# Patient Record
(demographics unavailable — no encounter records)

---

## 2022-07-16 LAB
INFLUENZA A: NOT DETECTED
INFLUENZA B: NOT DETECTED
SARS-COV-2: NOT DETECTED

## 2022-07-27 PROBLEM — Z17.0 ESTROGEN RECEPTOR POSITIVE: Status: ACTIVE | Noted: 2021-12-08

## 2022-07-27 PROBLEM — C50.811 MALIGNANT NEOPLASM OF OVERLAPPING SITES OF RIGHT FEMALE BREAST (HCC): Status: ACTIVE | Noted: 2022-07-27

## 2022-07-27 PROBLEM — Z85.3 HISTORY OF RIGHT BREAST CANCER: Status: ACTIVE | Noted: 2022-07-18

## 2022-07-27 PROBLEM — M25.561 RIGHT KNEE PAIN: Status: ACTIVE | Noted: 2022-06-24

## 2022-07-27 PROBLEM — Z79.810 CARE RELATED TO CURRENT TAMOXIFEN USE: Status: ACTIVE | Noted: 2021-12-08

## 2022-07-27 PROBLEM — M25.562 LEFT KNEE PAIN: Status: ACTIVE | Noted: 2022-06-24

## 2022-07-27 PROBLEM — M17.0 PRIMARY OSTEOARTHRITIS OF BOTH KNEES: Status: ACTIVE | Noted: 2022-06-24

## 2022-07-27 PROBLEM — E03.9 HYPOTHYROIDISM: Status: ACTIVE | Noted: 2022-07-27

## 2022-08-04 PROBLEM — M25.562 LEFT KNEE PAIN: Status: RESOLVED | Noted: 2022-06-24 | Resolved: 2022-08-04

## 2022-08-04 PROBLEM — N64.89 BREASTS ASYMMETRICAL: Status: ACTIVE | Noted: 2021-10-07

## 2022-08-04 PROBLEM — Z90.13 HISTORY OF BILATERAL MASTECTOMY: Status: ACTIVE | Noted: 2022-04-19

## 2022-08-04 PROBLEM — J45.909 ASTHMA: Status: ACTIVE | Noted: 2021-10-07

## 2022-08-04 PROBLEM — M25.561 RIGHT KNEE PAIN: Status: RESOLVED | Noted: 2022-06-24 | Resolved: 2022-08-04

## 2022-08-04 PROBLEM — C50.919 INFILTRATING DUCTAL CARCINOMA OF BREAST (HCC): Status: ACTIVE | Noted: 2021-10-07

## 2022-08-04 PROBLEM — C50.919 INFILTRATING DUCTAL CARCINOMA OF BREAST (HCC): Status: RESOLVED | Noted: 2021-10-07 | Resolved: 2022-08-04

## 2022-08-25 PROBLEM — M17.9 OSTEOARTHRITIS OF KNEE: Status: ACTIVE | Noted: 2022-08-25

## 2022-08-25 PROBLEM — E78.5 HYPERLIPIDEMIA: Status: ACTIVE | Noted: 2022-08-25

## 2022-08-25 PROBLEM — M25.569 PAIN IN JOINT, LOWER LEG: Status: ACTIVE | Noted: 2022-08-25

## 2022-08-25 PROBLEM — J32.9 SINUSITIS: Status: ACTIVE | Noted: 2022-08-25

## 2022-08-25 PROBLEM — E03.9 HYPOTHYROIDISM, UNSPECIFIED: Status: ACTIVE | Noted: 2022-08-25

## 2022-08-25 PROBLEM — H52.209 ASTIGMATISM: Status: ACTIVE | Noted: 2022-08-25

## 2022-08-25 PROBLEM — N63.0 BREAST LUMP OR MASS: Status: ACTIVE | Noted: 2022-08-25

## 2022-08-25 PROBLEM — N60.19 FIBROCYSTIC DISEASE OF BREAST: Status: ACTIVE | Noted: 2022-08-25

## 2022-08-25 PROBLEM — D69.6 THROMBOCYTOPENIA, UNSPECIFIED (HCC): Status: ACTIVE | Noted: 2022-08-25

## 2022-08-25 PROBLEM — D72.819 DECREASED WHITE BLOOD CELL COUNT, UNSPECIFIED: Status: ACTIVE | Noted: 2022-08-25

## 2022-08-25 PROBLEM — M62.81 MUSCLE WEAKNESS (GENERALIZED): Status: ACTIVE | Noted: 2022-08-25

## 2022-08-25 PROBLEM — N97.9 FEMALE INFERTILITY: Status: ACTIVE | Noted: 2022-08-25

## 2022-08-25 PROBLEM — R63.5 ABNORMAL WEIGHT GAIN: Status: ACTIVE | Noted: 2022-08-25

## 2022-08-25 PROBLEM — R10.2 FEMALE PELVIC PAIN: Status: ACTIVE | Noted: 2022-08-25

## 2022-08-25 PROBLEM — C50.911 MALIGNANT NEOPLASM OF UNSPECIFIED SITE OF RIGHT FEMALE BREAST (HCC): Status: ACTIVE | Noted: 2022-08-25

## 2022-08-25 PROBLEM — M25.562 PAIN IN LEFT KNEE: Status: ACTIVE | Noted: 2022-08-25

## 2022-08-25 PROBLEM — D64.9 ANEMIA, UNSPECIFIED: Status: ACTIVE | Noted: 2022-08-25

## 2022-08-25 PROBLEM — R10.9 ABDOMINAL PAIN: Status: ACTIVE | Noted: 2022-08-25

## 2022-09-24 PROBLEM — J32.9 SINUSITIS: Status: RESOLVED | Noted: 2022-08-25 | Resolved: 2022-09-24

## 2022-10-19 NOTE — DISCHARGE SUMMARY
ED Clinical Summary                         [facility]  [address]  [place], [place], [address] [phone]           PERSON INFORMATION  Name: Dana Al Age:  39 Years : 1977   Sex: Female Language: English PCP: PCP,  UNKNOWN   Marital Status:   Phone: [phone] Med Service: MED-Medicine   MRN:  [id number] Acct# [de-identified] Arrival: 2022 13:05:00   Visit Reason: Headache; Body aches; Breast pain right; RIGHT BREAST SWELLING Acuity: 3 LOS: 000 02:15   Address:      [address] [place] [address]  Diagnosis:      Breast swelling; S/P bilateral mastectomy  Printed Prescriptions: Allergies      No Known Medication Allergies      Medications Administered During Visit:                  Medication Dose Route   cephalexin 500 mg Oral   sulfamethoxazole-trimethoprim 1 tabs Oral       Patient Medication List:              levothyroxine (Synthroid 125 mcg (0.125 mg) oral tablet) 1 Tabs Oral (given by mouth) every day. sulfamethoxazole-trimethoprim (Bactrim  mg-160 mg oral tablet) 1 Tabs Oral (given by mouth) 2 times a day for 7 Days. Refills: 0.  tamoxifen (tamoxifen 10 mg oral tablet) 1 Tabs Oral (given by mouth) 2 times a day. Major Tests and Procedures: The following procedures and tests were performed during your ED visit.   COMMONPROCEDURES%>  COMMON PROCEDURESCOMMENTS%>          Laboratory Orders  Name Status Details   [facility] Completed Nasopharyngeal Swab, Stat, ST - Stat, 22 13:12:00 EDT, 22 13:12:00 EDT, Nurse Zabrina aggarwal E-PAC, Print label Y/N               Radiology Orders  Name Status Details    General Breast Limited Right Completed 22 13:38:00 EDT, STAT 1 hour or less, Reason: Disorder of breast, unspecified, Transport Mode: STRETCHER, pp_set_radiology_subspecialty               Patient Care Orders  Name Status Details   COVID-19 Status Ordered 22 13:12:42 EDT, NOT [address]arnie Hong, laboratory, radiology. , 07/16/22 13:12:42 EDT, COVID-19 Not Detected   DC ISO Order/Icons Ordered 07/16/22 13:40:54 EDT, 07/16/22 13:40:54 EDT   Discharge Patient Ordered 07/16/22 15:07:00 EDT   ED Assessment Adult Completed 07/16/22 13:10:01 EDT, 07/16/22 13:10:01 EDT   ED Secondary Triage Completed 07/16/22 13:10:01 EDT, 07/16/22 13:10:01 EDT   ED Triage Adult Completed 07/16/22 13:06:00 EDT, 07/16/22 13:06:00 EDT   Notify Provider Completed 07/16/22 13:12:42 EDT, This message can only be seen by Nursing, it is not visible to Pharmacy, Laboratory, or Radiology. , 07/16/22 13:12:42 EDT   Patient Isolation Ordered 07/16/22 13:12:00 EDT, Contact and Airborne, Constant Indicator             PROVIDER INFORMATION               Provider Role Assigned [facility] ED MidLevel 7/16/2022 13:11:54    Mendoza MARTINS-JEOVANNY ED Nurse 7/16/2022 13:24:56        Attending Physician:  Emeka Hobbs E-PAC     Admit Doc  Emeka Hobbs E-PAC     Consulting Doc       VITALS INFORMATION  Vital Sign Triage Latest   Temp Oral ORAL_1%>36.8 degC ORAL%>36.8 degC   Temp Temporal TEMPORAL_1%> TEMPORAL%>   Temp Intravascular INTRAVASCULAR_1%> INTRAVASCULAR%>   Temp Axillary AXILLARY_1%> AXILLARY%>   Temp Rectal RECTAL_1%> RECTAL%>   02 Sat 96 % 96 %   Respiratory Rate RATE_1%>16 br/min RATE%>18 br/min   Peripheral Pulse Rate PULSE RATE_1%> PULSE RATE%>   Apical Heart Rate HEART RATE_1%> HEART RATE%>   Blood Pressure BLOOD PRESSURE_1%>/ BLOOD PRESSURE_1%>86 mmHg BLOOD PRESSURE%>137 mmHg / BLOOD PRESSURE%>86 mmHg                 Immunizations      No Immunizations Documented This Visit          DISCHARGE INFORMATION   Discharge Disposition: H Outpt-Sent Home   Discharge Location:    Home   Discharge Date and Time:    7/16/2022 15:20:24   ED Checkout Date and Time:    7/16/2022 15:20:24     DEPART REASON INCOMPLETE INFORMATION               Depart Action Incomplete Reason   Interactive View/I&O Recently assessed               Problems      No Problems Documented              Smoking Status      No Smoking Status Documented         PATIENT EDUCATION INFORMATION  Instructions:       Breast Tenderness     Follow up:                    With: Address: When:   Primary care/ clinic  Within 3 to 5 days   Comments: Thank you so much for visiting with us today. You have had a screening emergency medical exam and to this point, no emergent condition has been identified. It is   important to realize that of course your visit today is simply a moment in time and that your medical condition can certainly change and bcome worse, necessitating re-   evaluation in an urgent or emergent manner. It is your responsibility to seek care in such instances. It is also your responsibility to follow up with your primary care   provider to discuss your Emergency Department visit and to go over any and all testing that was incurred during your Emergency Department visit. We have made you   aware of any pertinent results at this visit, however this may not have been comprehensive. Lack of an acute emergency condition does not mean that there is not a   problem, nor does it replace a thorough exam performed by a primary care physician. It is your responsibility to follow your discharge instructions as given, to follow up   with any other physicians as indicated, and to return to the Emergency Department as instructed. Thank you again for visiting with us today, please let us know if you   have any further questions. Please follow-up with your primary care provider over the next 3-5 days. Please return to the ER if symptoms change, worsen, or for any new concern. With: Address: When:   Wayne Jose  Within 2 to 3 days   Comments: Follow-up with breast surgery on Monday based on your ultrasound today. Also reach out to your oncologist at your earliest convenience.      [facility]   [address] [facility]. [address], [address] [facility]   [phone]           ED PROVIDER DOCUMENTATION

## 2022-10-19 NOTE — ED NOTES
ED Triage Note       ED Triage Adult Entered On:  7/16/2022 13:10 EDT    Performed On:  7/16/2022 13:06 EDT by Domenica Grimes               Triage   Numeric Rating Pain Scale :   7   Chief Complaint :   pt ambulates to triage reports swelling in the right breast with pain. having headache, body aches and nausea that started yesterday evening. [place] [facility] Mode of Arrival :   Private vehicle   Infectious Disease Documentation :   Document assessment   Temperature Oral :   36.8 degC(Converted to: 98.2 degF)    Heart Rate Monitored :   95 bpm   Respiratory Rate :   16 br/min   Systolic Blood Pressure :   952 mmHg   Diastolic Blood Pressure :   86 mmHg   SpO2 :   96 %   Oxygen Therapy :   Room air   Patient presentation :   None of the above   Chief Complaint or Presentation suggest infection :   Yes   Dosing Weight Obtained By :   Measured   Weight Dosing :   85.1 kg(Converted to: 187 lb 10 oz)    Height :   178 cm(Converted to: 5 ft 10 in)    Body Mass Index Dosing :   27 kg/m2   Robin Garcia - 7/16/2022 13:06 EDT   DCP GENERIC CODE   Tracking Acuity :   3   Tracking Group :   ED [facility]   Domenica Grimes - 7/16/2022 13:06 EDT   ED General Section :   Document assessment   Pregnancy Status :   Patient denies   ED Allergies Section :   Document assessment   ED Reason for Visit Section :   Document assessment   ED Quick Assessment :   Patient appears awake, alert, oriented to baseline. Skin warm and dry. Moves all extremities. Respiration even and unlabored. Appears in no apparent distress.    Domenica Grimes - 7/16/2022 13:06 EDT   ID Risk Screen Symptoms   Recent Travel History :   No recent travel   Last 90 days COVID-19 ID :   No   Close Contact with COVID-19 ID :   No   Last 14 days COVID-19 ID :   No   Robin Garcia - 7/16/2022 13:06 EDT   Allergies   (As Of: 7/16/2022 13:10:00 EDT)   Allergies (Active)   No Known Medication Allergies  Estimated Onset Date:   Unspecified ; Created By:   Benedict Camara; Reaction Status:   Active ; Category:   Drug ; Substance:   No Known Medication Allergies ; Type: Allergy ; Updated By:   Benedict Camara; Source:   Patient ; Reviewed Date:   2022 13:07 EDT        Psycho-Social   Last 3 mo, thoughts killing self/others :   Patient denies   Right click within box for Suspected Abuse policy link. :   None   Feels Safe Where Live :   Yes   ED Behavioral Activity Rating Scale :   4 - Quiet and awake (normal level of activity)   Sim Garcia-RN - 2022 13:06 EDT   ED Reason for Visit   (As Of: 2022 13:10:00 EDT)   Diagnoses(Active)    Body aches  Date:   2022 ; Diagnosis Type:   Reason For Visit ; Confirmation:   Complaint of ; Clinical Dx:    Body aches ; Classification:   Medical ; Clinical Service:   Emergency medicine ; Code:   PNED ; Probability:   0 ; Diagnosis Code:   [id number]      Breast pain right  Date:   2022 ; Diagnosis Type:   Reason For Visit ; Confirmation:   Complaint of ; Clinical Dx:   Breast pain right ; Classification:   Medical ; Clinical Service:   Emergency medicine ; Code:   PNED ; Probability:   0 ; Diagnosis Code:   [id number]      Headache  Date:   2022 ; Diagnosis Type:   Reason For Visit ; Confirmation:   Complaint of ; Clinical Dx:   Headache ; Classification:   Medical ; Clinical Service:   Emergency medicine ; Code:   PNED ; Probability:   0 ; Diagnosis Code:   [id number]

## 2022-10-19 NOTE — ED NOTES
ED Patient Education Note     Patient Education Materials Follows:  Obstetrics and Gynecology     Breast Tenderness    Breast tenderness is a common problem for women of all ages, but may also occur in men. Breast tenderness may range from mild discomfort to severe pain. In women, the pain usually comes and goes with the menstrual cycle, but it can also be constant. Breast tenderness has many possible causes, including hormone changes, infections, and taking certain medicines. You may have tests, such as a mammogram or an ultrasound, to check for any unusual findings. Having breast tenderness usually does not mean that you have breast cancer. Follow these instructions at home:    Managing pain and discomfort       If directed, put ice to the painful area. To do this:  ? Put ice in a plastic bag.    ? Place a towel between your skin and the bag.    ? Leave the ice on for 20 minutes, 2?3 times a day. Wear a supportive bra, especially during exercise. You may also want to wear a supportive bra while sleeping if your breasts are very tender. Medicines     Take over-the-counter and prescription medicines only as told by your health care provider. If the cause of your pain is infection, you may be prescribed an antibiotic medicine. If you were prescribed an antibiotic, take it as told by your health care provider. Do not stop taking the antibiotic even if you start to feel better. Eating and drinking     Your health care provider may recommend that you lessen the amount of fat in your diet. You can do this by:  ? Limiting fried foods. ? Cooking foods using methods such as baking, boiling, grilling, and broiling. Decrease the amount of caffeine in your diet. Instead, drink more water and choose caffeine-free drinks. General instructions       Keep a log of the days and times when your breasts are most tender. Ask your health care provider how to do breast exams at home.  This will help you notice if you have an unusual growth or lump. Keep all follow-up visits as told by your health care provider. This is important. Contact a health care provider if:     Any part of your breast is hard, red, and hot to the touch. This may be a sign of infection. You are a woman and:  ? Not breastfeeding and you have fluid, especially blood or pus, coming out of your nipples. ? Have a new or painful lump in your breast that remains after your menstrual period ends. You have a fever. Your pain does not improve or it gets worse. Your pain is interfering with your daily activities. Summary     Breast tenderness may range from mild discomfort to severe pain. Breast tenderness has many possible causes, including hormone changes, infections, and taking certain medicines. It can be treated with ice, wearing a supportive bra, and medicines. Make changes to your diet if told to by your health care provider. This information is not intended to replace advice given to you by your health care provider. Make sure you discuss any questions you have with your health care provider. Document Revised: 05/11/2020 Document Reviewed: 05/11/2020  Elsevier Patient Education ?  [id number] [address].

## 2022-10-19 NOTE — ED NOTES
ED Triage Note       ED Secondary Triage Entered On:  7/16/2022 13:43 EDT    Performed On:  7/16/2022 13:42 EDT by Shy MORFIN               General Information   Barriers to Learning :   None evident   ED Home Meds Section :   Document assessment   [facility] ED Fall Risk Section :   Document assessment   ED Advance Directives Section :   Document assessment   ED Palliative Screen :   N/A (prefilled for <64yo)   Shy MORFIN - 7/16/2022 13:42 EDT   (As Of: 7/16/2022 13:43:05 EDT)   Diagnoses(Active)    Body aches  Date:   7/16/2022 ; Diagnosis Type:   Reason For Visit ; Confirmation:   Complaint of ; Clinical Dx:    Body aches ; Classification:   Medical ; Clinical Service:   Emergency medicine ; Code:   PNED ; Probability:   0 ; Diagnosis Code:   [id number]      Breast pain right  Date:   7/16/2022 ; Diagnosis Type:   Reason For Visit ; Confirmation:   Complaint of ; Clinical Dx:   Breast pain right ; Classification:   Medical ; Clinical Service:   Emergency medicine ; Code:   PNED ; Probability:   0 ; Diagnosis Code:   [id number]      Headache  Date:   7/16/2022 ; Diagnosis Type:   Reason For Visit ; Confirmation:   Complaint of ; Clinical Dx:   Headache ; Classification:   Medical ; Clinical Service:   Emergency medicine ; Code:   PNED ; Probability:   0 ; Diagnosis Code:   [id number]             -    Procedure History   (As Of: 7/16/2022 13:43:05 EDT)     [facility] Fall Risk Assessment Tool   Hx of falling last 3 months ED Fall :   No   Shy MORFIN - 7/16/2022 13:42 EDT   ED Advance Directive   Advance Directive :   Yes   Type of Advance Directive :   Living will, Medical durable power of    Shy MORFIN - 7/16/2022 13:42 EDT   Med Hx   Medication List   (As Of: 7/16/2022 13:43:05 EDT)   Normal Order    cephalexin 500 mg Cap  :   cephalexin 500 mg Cap ; Status:   Completed ; Ordered As Mnemonic:   Keflex ; Simple Display Line:   500 mg, 1 caps, Oral, Once ; Ordering Provider:   Dejuan STRINGERPAC; Catalog Code:   cephalexin ; Order Dt/Tm:   7/16/2022 13:39:00 EDT            Home Meds    levothyroxine  :   levothyroxine ; Status:   Documented ; Ordered As Mnemonic:   Synthroid 125 mcg (0.125 mg) oral tablet ; Simple Display Line:   125 mcg, 1 tabs, Oral, Daily, 0 Refill(s) ; Catalog Code:   levothyroxine ; Order Dt/Tm:   7/16/2022 13:42:41 EDT          tamoxifen  :   tamoxifen ; Status:   Documented ; Ordered As Mnemonic:   tamoxifen 10 mg oral tablet ; Simple Display Line:   10 mg, 1 tabs, Oral, BID, 180 tabs, 0 Refill(s) ;  Catalog Code:   tamoxifen ; Order Dt/Tm:   7/16/2022 13:42:41 EDT

## 2022-10-19 NOTE — ED NOTES
ED Patient Summary       ;          [facility]  [address], [place], [address] [place] [address]  [phone]  Discharge Instructions (Patient)  Kristi Parker  :  1977                   MRN: [id number]                   FIN: [id number]  Reason For Visit: Headache; Body aches; Breast pain right; RIGHT BREAST SWELLING  Final Diagnosis: Breast swelling; S/P bilateral mastectomy     Visit Date: 2022 13:05:00  Address: [address] [place] [id number]  Phone: [phone]     Emergency Department Providers:         Primary Physician:      Keagan Rojas [facility] would like to thank you for allowing us to assist you with your healthcare needs. The following includes patient education materials and information regarding your injury/illness. Follow-up Instructions: You were seen today on an emergency basis. Please contact your primary care doctor for a follow up appointment. If you received a referral to a specialist doctor, it is important you follow-up as instructed. It is important that you call your follow-up doctor to schedule and confirm the location of your next appointment. Your doctor may practice at multiple locations. The office location of your follow-up appointment may be different to the one written on your discharge instructions. If you do not have a primary care doctor, please call [phone] for help in finding a Francisco Ramon. [facility] Provider. For help in finding a specialist doctor, please call [phone]. If your condition gets worse before your follow-up with your primary care doctor or specialist, please return to the Emergency Department. Coronavirus 2019 (COVID-19) Reminders:     Patients age 15 - 24, with parental consent, and over age 25 can make an appointment for a COVID-19 vaccine.  Patients can contact their [facility] doctors' offices to schedule an appointment to receive the COVID-19 vaccine. Patients who do not have a [place] physician can call [phone] to schedule vaccination appointments. Follow Up Appointments:  Primary Care Provider:     Name: PCP,  UNKNOWN     Phone:                  With: Address: When:   Primary care/ clinic  Within 3 to 5 days   Comments: Thank you so much for visiting with us today. You have had a screening emergency medical exam and to this point, no emergent condition has been identified. It is   important to realize that of course your visit today is simply a moment in time and that your medical condition can certainly change and bcome worse, necessitating re-   evaluation in an urgent or emergent manner. It is your responsibility to seek care in such instances. It is also your responsibility to follow up with your primary care   provider to discuss your Emergency Department visit and to go over any and all testing that was incurred during your Emergency Department visit. We have made you   aware of any pertinent results at this visit, however this may not have been comprehensive. Lack of an acute emergency condition does not mean that there is not a   problem, nor does it replace a thorough exam performed by a primary care physician. It is your responsibility to follow your discharge instructions as given, to follow up   with any other physicians as indicated, and to return to the Emergency Department as instructed. Thank you again for visiting with us today, please let us know if you   have any further questions. Please follow-up with your primary care provider over the next 3-5 days. Please return to the ER if symptoms change, worsen, or for any new concern. With: Address: When:   Rody Lopes  Within 2 to 3 days   Comments: Follow-up with breast surgery on Monday based on your ultrasound today. Also reach out to your oncologist at your earliest convenience.      Sammy Jolley. ED [facility]   [address] [place] [address] , [facility], [facility] [place] [facility]   [phone] [address] SERVICES%>             New Medications  Printed Prescriptions  sulfamethoxazole-trimethoprim (Bactrim  mg-160 mg oral tablet) 1 Tabs Oral (given by mouth) 2 times a day for 7 Days. Refills: 0. Last Dose:____________________  Medications that have not changed  Other Medications  levothyroxine (Synthroid 125 mcg (0.125 mg) oral tablet) 1 Tabs Oral (given by mouth) every day. Last Dose:____________________  tamoxifen (tamoxifen 10 mg oral tablet) 1 Tabs Oral (given by mouth) 2 times a day. Last Dose:____________________      Allergy Info: No Known Medication Allergies     Discharge Additional Information          Discharge Patient 07/16/22 15:07:00 EDT      Patient Education Materials:        Breast Tenderness    Breast tenderness is a common problem for women of all ages, but may also occur in men. Breast tenderness may range from mild discomfort to severe pain. In women, the pain usually comes and goes with the menstrual cycle, but it can also be constant. Breast tenderness has many possible causes, including hormone changes, infections, and taking certain medicines. You may have tests, such as a mammogram or an ultrasound, to check for any unusual findings. Having breast tenderness usually does not mean that you have breast cancer. Follow these instructions at home:    Managing pain and discomfort       If directed, put ice to the painful area. To do this:  ? Put ice in a plastic bag.    ? Place a towel between your skin and the bag.    ? Leave the ice on for 20 minutes, 2?3 times a day. Wear a supportive bra, especially during exercise. You may also want to wear a supportive bra while sleeping if your breasts are very tender.       Medicines     Take over-the-counter and prescription medicines only as told by your health care provider. If the cause of your pain is infection, you may be prescribed an antibiotic medicine. If you were prescribed an antibiotic, take it as told by your health care provider. Do not stop taking the antibiotic even if you start to feel better. Eating and drinking     Your health care provider may recommend that you lessen the amount of fat in your diet. You can do this by:  ? Limiting fried foods. ? Cooking foods using methods such as baking, boiling, grilling, and broiling. Decrease the amount of caffeine in your diet. Instead, drink more water and choose caffeine-free drinks. General instructions       Keep a log of the days and times when your breasts are most tender. Ask your health care provider how to do breast exams at home. This will help you notice if you have an unusual growth or lump. Keep all follow-up visits as told by your health care provider. This is important. Contact a health care provider if:     Any part of your breast is hard, red, and hot to the touch. This may be a sign of infection. You are a woman and:  ? Not breastfeeding and you have fluid, especially blood or pus, coming out of your nipples. ? Have a new or painful lump in your breast that remains after your menstrual period ends. You have a fever. Your pain does not improve or it gets worse. Your pain is interfering with your daily activities. Summary     Breast tenderness may range from mild discomfort to severe pain. Breast tenderness has many possible causes, including hormone changes, infections, and taking certain medicines. It can be treated with ice, wearing a supportive bra, and medicines. Make changes to your diet if told to by your health care provider. This information is not intended to replace advice given to you by your health care provider. Make sure you discuss any questions you have with your health care provider.       Document Revised: 05/11/2020 Document Reviewed: 05/11/2020  Elsevier Patient Education ? [address]      ---------------------------------------------------------------------------------------------------------------------  [facility] allows patients to review your COVID and other test results as well as discharge documents from any [facility], Emergency Department, surgical center or outpatient lab. Test results are typically available 36 hours after the test is completed. [address] encourages you to self-enroll in the [facility] Patient Portal.     To begin your self-enrollment process, please visit [email or web address] Under [facility], click on Sign up now. NOTE: You must be 16 years and older to use [facility] Self-Enroll online. If you are a parent, caregiver, or guardian; you need an invite to access your childs or dependents health records. To obtain an invite, contact the Medical Records department at [phone] Monday through Friday, 8-4:30, select option 3 . If we receive your call afterhours, we will return your call the next business day. If you have issues trying to create or access your account, contact [employer] at [phone] available 7 days a week 24 hours a day.      Comment:

## 2022-10-19 NOTE — ED PROVIDER NOTES
Breast problem        Patient:   Easton Block             MRN: [id number]            FIN: [id number]               Age:   39 years     Sex:  Female     :  1977   Associated Diagnoses:   Breast swelling; S/P bilateral mastectomy   Author:   Suraj SIGALA      Basic Information   Time seen: Provider Seen (ST)   ED Provider/Time:    Suraj STRINGERPAC / 2022 13:11  . Additional information: Chief Complaint from Nursing Triage Note   Chief Complaint  Chief Complaint: pt ambulates to triage reports swelling in the right breast with pain. having headache, body aches and nausea that started yesterday evening. (22 13:06:00). History of Present Illness   55-year-old female with a past medical history of a bilateral mastectomy presents to the ER today for evaluation due to right breast swelling and pain. He states this started yesterday evening. She is followed by [facility] due to her previous mastectomy. Maste[facility] was performed in 2021 at [facility] and the patient received revisions of the left breast for silicone, DIP flap of the right. She does state that the right breast is normally more swollen than the left. However she feels that the right is even more swollen than normal today. She is denying any drainage of the right breast or color change. No palpable masses per the patient. She denies any fevers at home however does endorse some chills, and malaise. She is denying any nasal congestion, cough, shortness of breath, chest pain, dyspnea. No known drug allergies. Otherwise no past medical history. .        Review of Systems   Constitutional symptoms:  Chills, no fever, no sweats. ENMT symptoms:  Negative except as documented in HPI. Respiratory symptoms:  Negative except as documented in HPI. Cardiovascular symptoms:  Negative except as documented in HPI. Additional review of systems information: All other systems reviewed and otherwise negative. Health Status   Allergies: Allergic Reactions (Selected)  No Known Medication Allergies. Medications:  (Selected)   Documented Medications  Documented  Synthroid 125 mcg (0.125 mg) oral tablet: 125 mcg, 1 tabs, Oral, Daily, 0 Refill(s)  tamoxifen 10 mg oral tablet: 10 mg, 1 tabs, Oral, BID, 180 tabs, 0 Refill(s). Past Medical/ Family/ Social History   Medical history: Reviewed as documented in chart. Surgical history: Reviewed as documented in chart. Family history: Not significant. Social history: Reviewed as documented in chart. Problem list:    No qualifying data available  . Physical Examination               Vital Signs   Vital Signs   7/16/2022 13:43 EDT Respiratory Rate 18 br/min   5/43/4179 99:87 EDT Systolic Blood Pressure 981 mmHg    Diastolic Blood Pressure 86 mmHg    Temperature Oral 36.8 degC    Heart Rate Monitored 95 bpm    Respiratory Rate 16 br/min    SpO2 96 %   . Measurements   7/16/2022 13:10 EDT Body Mass Index est jose 26.86 kg/m2    Body Mass Index Measured 26.86 kg/m2   7/16/2022 13:06 EDT Height/Length Measured 178 cm    Weight Dosing 85.1 kg   . General:  Alert, no acute distress, not anxious, not ill-appearing. Skin:  Warm. Head:  Normocephalic, atraumatic. Neck:  Supple, trachea midline. Eye:  Extraocular movements are intact, normal conjunctiva. Ears, nose, mouth and throat:  Oral mucosa moist, no pharyngeal erythema or exudate. Cardiovascular:  Regular rate and rhythm, No murmur, Normal peripheral perfusion. Respiratory:  Lungs are clear to auscultation, respirations are non-labored, breath sounds are equal, Symmetrical chest wall expansion. Chest wall:  No deformity, Surgical changes of the bilateral breast.   On visualization the right breast does appear mildly larger than the left breast.  There is no palpable mass of the right breast.  It does feel mildly warm to palpation. No erythema. With palpation there is no drainage. Chaperoned by ER tech [place]. .    Musculoskeletal:  Normal ROM. Gastrointestinal:  Non distended. Psychiatric:  Cooperative, appropriate mood & affect. Medical Decision Making   Rationale:  PA/NP reviewed with co-signing physician: ECG, lab results, radiology studies, medication, diagnosis, and plan of care. Documents reviewed:  Emergency department nurses' notes, flowsheet, emergency department records, prior records, vital signs. Radiology results:  Rad Results (ST)   US General Breast Limited Right  ?  07/16/22 14:48:01  Limited right breast ultrasound 07/16/22    COMPARISON: None    INDICATION: Disorder of breast, unspecified, history of recent double  mastectomy. Now with right breast swelling. FINDINGS/IMPRESSION: Targeted ultrasound performed within the lower outer  quadrant of the right breast, 8 cm of the nipple within the area patients  reported pain. There is somewhat hypoechoic area noted deep within the  subcutaneous fat measuring up to 2.4 x 1.3 x 3.6 cm. Finding does not appear  compatible with simple fluid but could represent a complex fluid  collection/abscess or possibly focal area of phlegmon. Clinical correlation and  short follow-up are recommended. ?  Signed By: Antonio STEINER  .   Notes:  70-year-old female presents the ER today for 1 day of right breast swelling and pain. Previous history of bilateral mastectomy. Denies any fevers, drainage, color change. Goal exam shows a patient in no acute distress, non-ill-appearing, nontoxic-appearing, with stable vital signs. Physical exam is mostly benign, however based on the patient's past medical history will limited ultrasound of the right breast is performed showing a nonspecific echoic finding of the right breast concerning for right breast abscess. The patient was initially treated in the ER with Keflex, however she later treated with Bactrim. She is given Bactrim for continued treatment at home.   She is connected with follow-up information and advised to call early Monday morning to [facility] breast specialist in regards to scheduling a office visit that day. She is also to make contact with her [facility] oncologist.  She is to return to the ER if symptoms change or worsen. .      Impression and Plan   Diagnosis   Breast swelling (SBZ24-FX N63.0, Discharge, Medical)   S/P bilateral mastectomy ([id number] Z90.13, Discharge, Medical)   Plan   Condition: Stable. Disposition: Discharged: to home. Prescriptions: Launch prescriptions   Pharmacy:  Bactrim  mg-160 mg oral tablet (Prescribe): 1 tabs, Oral, BID, for 7 days, 14 tabs, 0 Refill(s). Patient was given the following educational materials: Breast Tenderness. Follow up with: Kate Aguirre Within 2 to 3 days Follow-up with breast surgery on Monday based on your ultrasound today. Also reach out to your oncologist at your earliest convenience. [facility]  [address] [place] [address] , [address] [place] [address], [address] [place] [address]  [phone]; Primary care/ clinic Within 3 to 5 days Thank you so much for visiting with us today. You have had a screening emergency medical exam and to this point, no emergent condition has been identified. It is  important to realize that of course your visit today is simply a moment in time and that your medical condition can certainly change and bcome worse, necessitating re-  evaluation in an urgent or emergent manner. It is your responsibility to seek care in such instances. It is also your responsibility to follow up with your primary care   provider to discuss your Emergency Department visit and to go over any and all testing that was incurred during your Emergency Department visit. We have made you   aware of any pertinent results at this visit, however this may not have been comprehensive.   Lack of an acute emergency condition does not mean that there is not a problem, nor does it replace a thorough exam performed by a primary care physician. It is your responsibility to follow your discharge instructions as given, to follow up   with any other physicians as indicated, and to return to the Emergency Department as instructed. Thank you again for visiting with us today, please let us know if you   have any further questions. Please follow-up with your primary care provider over the next 3-5 days. Please return to the ER if symptoms change, worsen, or for any new concern. .    Counseled: Patient, Regarding diagnosis, Regarding diagnostic results, Regarding treatment plan, Patient indicated understanding of instructions.     Signature Line     Electronically Signed on 07/16/2022 04:20 PM EDT   ________________________________________________   Charly Zamudio E-PAC      Electronically Signed on 07/17/2022 08:30 PM EDT   ________________________________________________   Helga GREGORIO            Modified by: Charly Zamudio E-PAC on 07/16/2022 03:07 PM EDT      Modified by: Charly TORRES-PAC on 07/16/2022 04:20 PM EDT